# Patient Record
Sex: FEMALE | Race: BLACK OR AFRICAN AMERICAN
[De-identification: names, ages, dates, MRNs, and addresses within clinical notes are randomized per-mention and may not be internally consistent; named-entity substitution may affect disease eponyms.]

---

## 2019-09-17 ENCOUNTER — HOSPITAL ENCOUNTER (OUTPATIENT)
Dept: HOSPITAL 9 - MADRAD | Age: 39
Discharge: HOME | End: 2019-09-17
Attending: FAMILY MEDICINE
Payer: COMMERCIAL

## 2019-09-17 DIAGNOSIS — R76.11: Primary | ICD-10-CM

## 2019-09-17 PROCEDURE — 71046 X-RAY EXAM CHEST 2 VIEWS: CPT

## 2019-09-17 NOTE — RAD
CHEST TWO VIEWS:

 

HISTORY:

Positive PPD skin test.

 

FINDINGS:

The lungs appear clear.  The heart and mediastinum are unremarkable.  The osseous structures are unre
markable.  No evidence of primary or secondary TB.

 

IMPRESSION:

Unremarkable chest.

 

POS: OFF

## 2022-02-12 ENCOUNTER — HOSPITAL ENCOUNTER (EMERGENCY)
Dept: HOSPITAL 9 - MADERS | Age: 42
Discharge: HOME | End: 2022-02-12
Payer: SELF-PAY

## 2022-02-12 DIAGNOSIS — L03.113: Primary | ICD-10-CM

## 2022-02-12 DIAGNOSIS — I10: ICD-10-CM

## 2022-02-12 PROCEDURE — 99283 EMERGENCY DEPT VISIT LOW MDM: CPT
